# Patient Record
Sex: FEMALE | Race: WHITE | NOT HISPANIC OR LATINO | URBAN - METROPOLITAN AREA
[De-identification: names, ages, dates, MRNs, and addresses within clinical notes are randomized per-mention and may not be internally consistent; named-entity substitution may affect disease eponyms.]

---

## 2018-12-17 ENCOUNTER — INPATIENT (INPATIENT)
Facility: HOSPITAL | Age: 28
LOS: 1 days | Discharge: HOME | End: 2018-12-19
Attending: OBSTETRICS & GYNECOLOGY | Admitting: OBSTETRICS & GYNECOLOGY
Payer: COMMERCIAL

## 2018-12-17 VITALS — HEIGHT: 67 IN | WEIGHT: 169.76 LBS

## 2018-12-17 LAB
ALBUMIN SERPL ELPH-MCNC: 4 G/DL — SIGNIFICANT CHANGE UP (ref 3.3–5)
ALP SERPL-CCNC: 119 U/L — SIGNIFICANT CHANGE UP (ref 40–120)
ALT FLD-CCNC: 25 U/L — SIGNIFICANT CHANGE UP (ref 10–45)
ANION GAP SERPL CALC-SCNC: 15 MMOL/L — SIGNIFICANT CHANGE UP (ref 5–17)
APPEARANCE UR: CLEAR — SIGNIFICANT CHANGE UP
APTT BLD: 29.8 SEC — SIGNIFICANT CHANGE UP (ref 27.5–36.3)
AST SERPL-CCNC: 26 U/L — SIGNIFICANT CHANGE UP (ref 10–40)
BASOPHILS NFR BLD AUTO: 0.1 % — SIGNIFICANT CHANGE UP (ref 0–2)
BILIRUB SERPL-MCNC: 0.2 MG/DL — SIGNIFICANT CHANGE UP (ref 0.2–1.2)
BILIRUB UR-MCNC: NEGATIVE — SIGNIFICANT CHANGE UP
BUN SERPL-MCNC: 7 MG/DL — SIGNIFICANT CHANGE UP (ref 7–23)
CALCIUM SERPL-MCNC: 9 MG/DL — SIGNIFICANT CHANGE UP (ref 8.4–10.5)
CHLORIDE SERPL-SCNC: 102 MMOL/L — SIGNIFICANT CHANGE UP (ref 96–108)
CO2 SERPL-SCNC: 24 MMOL/L — SIGNIFICANT CHANGE UP (ref 22–31)
COLOR SPEC: YELLOW — SIGNIFICANT CHANGE UP
CREAT SERPL-MCNC: 0.47 MG/DL — LOW (ref 0.5–1.3)
DIFF PNL FLD: NEGATIVE — SIGNIFICANT CHANGE UP
EOSINOPHIL NFR BLD AUTO: 0.8 % — SIGNIFICANT CHANGE UP (ref 0–6)
FIBRINOGEN PPP-MCNC: 404 MG/DL — SIGNIFICANT CHANGE UP (ref 258–438)
GLUCOSE SERPL-MCNC: 85 MG/DL — SIGNIFICANT CHANGE UP (ref 70–99)
GLUCOSE UR QL: NEGATIVE — SIGNIFICANT CHANGE UP
HCT VFR BLD CALC: 38.3 % — SIGNIFICANT CHANGE UP (ref 34.5–45)
HGB BLD-MCNC: 12.9 G/DL — SIGNIFICANT CHANGE UP (ref 11.5–15.5)
INR BLD: 0.94 — SIGNIFICANT CHANGE UP (ref 0.88–1.16)
KETONES UR-MCNC: NEGATIVE — SIGNIFICANT CHANGE UP
LDH SERPL L TO P-CCNC: 233 U/L — SIGNIFICANT CHANGE UP (ref 50–242)
LEUKOCYTE ESTERASE UR-ACNC: ABNORMAL
LYMPHOCYTES # BLD AUTO: 9.8 % — LOW (ref 13–44)
MCHC RBC-ENTMCNC: 30.9 PG — SIGNIFICANT CHANGE UP (ref 27–34)
MCHC RBC-ENTMCNC: 33.7 G/DL — SIGNIFICANT CHANGE UP (ref 32–36)
MCV RBC AUTO: 91.6 FL — SIGNIFICANT CHANGE UP (ref 80–100)
MONOCYTES NFR BLD AUTO: 8.9 % — SIGNIFICANT CHANGE UP (ref 2–14)
NEUTROPHILS NFR BLD AUTO: 80.4 % — HIGH (ref 43–77)
NITRITE UR-MCNC: NEGATIVE — SIGNIFICANT CHANGE UP
PH UR: 7 — SIGNIFICANT CHANGE UP (ref 5–8)
PLATELET # BLD AUTO: 134 K/UL — LOW (ref 150–400)
POTASSIUM SERPL-MCNC: 4.3 MMOL/L — SIGNIFICANT CHANGE UP (ref 3.5–5.3)
POTASSIUM SERPL-SCNC: 4.3 MMOL/L — SIGNIFICANT CHANGE UP (ref 3.5–5.3)
PROT SERPL-MCNC: 6.7 G/DL — SIGNIFICANT CHANGE UP (ref 6–8.3)
PROT UR-MCNC: NEGATIVE MG/DL — SIGNIFICANT CHANGE UP
PROTHROM AB SERPL-ACNC: 10.6 SEC — SIGNIFICANT CHANGE UP (ref 10–12.9)
RBC # BLD: 4.18 M/UL — SIGNIFICANT CHANGE UP (ref 3.8–5.2)
RBC # FLD: 14.6 % — SIGNIFICANT CHANGE UP (ref 10.3–16.9)
SODIUM SERPL-SCNC: 141 MMOL/L — SIGNIFICANT CHANGE UP (ref 135–145)
SP GR SPEC: 1.01 — SIGNIFICANT CHANGE UP (ref 1–1.03)
T PALLIDUM AB TITR SER: NEGATIVE — SIGNIFICANT CHANGE UP
URATE SERPL-MCNC: 3 MG/DL — SIGNIFICANT CHANGE UP (ref 2.5–7)
UROBILINOGEN FLD QL: 0.2 E.U./DL — SIGNIFICANT CHANGE UP
WBC # BLD: 10.1 K/UL — SIGNIFICANT CHANGE UP (ref 3.8–10.5)
WBC # FLD AUTO: 10.1 K/UL — SIGNIFICANT CHANGE UP (ref 3.8–10.5)

## 2018-12-17 RX ORDER — ACETAMINOPHEN 500 MG
650 TABLET ORAL EVERY 6 HOURS
Qty: 0 | Refills: 0 | Status: DISCONTINUED | OUTPATIENT
Start: 2018-12-17 | End: 2018-12-19

## 2018-12-17 RX ORDER — OXYCODONE AND ACETAMINOPHEN 5; 325 MG/1; MG/1
1 TABLET ORAL
Qty: 0 | Refills: 0 | Status: DISCONTINUED | OUTPATIENT
Start: 2018-12-17 | End: 2018-12-19

## 2018-12-17 RX ORDER — SODIUM CHLORIDE 9 MG/ML
1000 INJECTION, SOLUTION INTRAVENOUS ONCE
Qty: 0 | Refills: 0 | Status: COMPLETED | OUTPATIENT
Start: 2018-12-17 | End: 2018-12-17

## 2018-12-17 RX ORDER — LANOLIN
1 OINTMENT (GRAM) TOPICAL EVERY 6 HOURS
Qty: 0 | Refills: 0 | Status: DISCONTINUED | OUTPATIENT
Start: 2018-12-17 | End: 2018-12-19

## 2018-12-17 RX ORDER — DIBUCAINE 1 %
1 OINTMENT (GRAM) RECTAL EVERY 4 HOURS
Qty: 0 | Refills: 0 | Status: DISCONTINUED | OUTPATIENT
Start: 2018-12-17 | End: 2018-12-19

## 2018-12-17 RX ORDER — MAGNESIUM HYDROXIDE 400 MG/1
30 TABLET, CHEWABLE ORAL
Qty: 0 | Refills: 0 | Status: DISCONTINUED | OUTPATIENT
Start: 2018-12-17 | End: 2018-12-19

## 2018-12-17 RX ORDER — OXYTOCIN 10 UNIT/ML
1 VIAL (ML) INJECTION
Qty: 30 | Refills: 0 | Status: DISCONTINUED | OUTPATIENT
Start: 2018-12-17 | End: 2018-12-17

## 2018-12-17 RX ORDER — PRAMOXINE HYDROCHLORIDE 150 MG/15G
1 AEROSOL, FOAM RECTAL EVERY 4 HOURS
Qty: 0 | Refills: 0 | Status: DISCONTINUED | OUTPATIENT
Start: 2018-12-17 | End: 2018-12-19

## 2018-12-17 RX ORDER — ENOXAPARIN SODIUM 100 MG/ML
40 INJECTION SUBCUTANEOUS DAILY
Qty: 0 | Refills: 0 | Status: DISCONTINUED | OUTPATIENT
Start: 2018-12-18 | End: 2018-12-19

## 2018-12-17 RX ORDER — AER TRAVELER 0.5 G/1
1 SOLUTION RECTAL; TOPICAL EVERY 4 HOURS
Qty: 0 | Refills: 0 | Status: DISCONTINUED | OUTPATIENT
Start: 2018-12-17 | End: 2018-12-19

## 2018-12-17 RX ORDER — DOCUSATE SODIUM 100 MG
100 CAPSULE ORAL
Qty: 0 | Refills: 0 | Status: DISCONTINUED | OUTPATIENT
Start: 2018-12-17 | End: 2018-12-19

## 2018-12-17 RX ORDER — OXYCODONE AND ACETAMINOPHEN 5; 325 MG/1; MG/1
2 TABLET ORAL EVERY 6 HOURS
Qty: 0 | Refills: 0 | Status: DISCONTINUED | OUTPATIENT
Start: 2018-12-17 | End: 2018-12-17

## 2018-12-17 RX ORDER — FENTANYL/BUPIVACAINE/NS/PF 2MCG/ML-.1
250 PLASTIC BAG, INJECTION (ML) INJECTION
Qty: 0 | Refills: 0 | Status: DISCONTINUED | OUTPATIENT
Start: 2018-12-17 | End: 2018-12-19

## 2018-12-17 RX ORDER — BENZOCAINE 10 %
1 GEL (GRAM) MUCOUS MEMBRANE EVERY 6 HOURS
Qty: 0 | Refills: 0 | Status: DISCONTINUED | OUTPATIENT
Start: 2018-12-17 | End: 2018-12-19

## 2018-12-17 RX ORDER — OXYTOCIN 10 UNIT/ML
333.33 VIAL (ML) INJECTION
Qty: 20 | Refills: 0 | Status: DISCONTINUED | OUTPATIENT
Start: 2018-12-17 | End: 2018-12-18

## 2018-12-17 RX ORDER — DIPHENHYDRAMINE HCL 50 MG
25 CAPSULE ORAL EVERY 6 HOURS
Qty: 0 | Refills: 0 | Status: DISCONTINUED | OUTPATIENT
Start: 2018-12-17 | End: 2018-12-19

## 2018-12-17 RX ORDER — IBUPROFEN 200 MG
600 TABLET ORAL EVERY 6 HOURS
Qty: 0 | Refills: 0 | Status: DISCONTINUED | OUTPATIENT
Start: 2018-12-17 | End: 2018-12-19

## 2018-12-17 RX ORDER — SIMETHICONE 80 MG/1
80 TABLET, CHEWABLE ORAL EVERY 6 HOURS
Qty: 0 | Refills: 0 | Status: DISCONTINUED | OUTPATIENT
Start: 2018-12-17 | End: 2018-12-19

## 2018-12-17 RX ORDER — SODIUM CHLORIDE 9 MG/ML
3 INJECTION INTRAMUSCULAR; INTRAVENOUS; SUBCUTANEOUS EVERY 8 HOURS
Qty: 0 | Refills: 0 | Status: DISCONTINUED | OUTPATIENT
Start: 2018-12-17 | End: 2018-12-19

## 2018-12-17 RX ORDER — TETANUS TOXOID, REDUCED DIPHTHERIA TOXOID AND ACELLULAR PERTUSSIS VACCINE, ADSORBED 5; 2.5; 8; 8; 2.5 [IU]/.5ML; [IU]/.5ML; UG/.5ML; UG/.5ML; UG/.5ML
0.5 SUSPENSION INTRAMUSCULAR ONCE
Qty: 0 | Refills: 0 | Status: DISCONTINUED | OUTPATIENT
Start: 2018-12-17 | End: 2018-12-19

## 2018-12-17 RX ORDER — OXYTOCIN 10 UNIT/ML
25 VIAL (ML) INJECTION
Qty: 20 | Refills: 0 | Status: DISCONTINUED | OUTPATIENT
Start: 2018-12-17 | End: 2018-12-18

## 2018-12-17 RX ORDER — OXYTOCIN 10 UNIT/ML
25 VIAL (ML) INJECTION
Qty: 20 | Refills: 0 | Status: DISCONTINUED | OUTPATIENT
Start: 2018-12-17 | End: 2018-12-19

## 2018-12-17 RX ORDER — SODIUM CHLORIDE 9 MG/ML
1000 INJECTION, SOLUTION INTRAVENOUS
Qty: 0 | Refills: 0 | Status: DISCONTINUED | OUTPATIENT
Start: 2018-12-17 | End: 2018-12-17

## 2018-12-17 RX ORDER — HYDROCORTISONE 1 %
1 OINTMENT (GRAM) TOPICAL EVERY 4 HOURS
Qty: 0 | Refills: 0 | Status: DISCONTINUED | OUTPATIENT
Start: 2018-12-17 | End: 2018-12-19

## 2018-12-17 RX ADMIN — SODIUM CHLORIDE 2000 MILLILITER(S): 9 INJECTION, SOLUTION INTRAVENOUS at 17:15

## 2018-12-17 RX ADMIN — SODIUM CHLORIDE 3 MILLILITER(S): 9 INJECTION INTRAMUSCULAR; INTRAVENOUS; SUBCUTANEOUS at 23:21

## 2018-12-17 RX ADMIN — Medication 1 MILLIUNIT(S)/MIN: at 13:25

## 2018-12-17 RX ADMIN — SODIUM CHLORIDE 125 MILLILITER(S): 9 INJECTION, SOLUTION INTRAVENOUS at 17:46

## 2018-12-17 RX ADMIN — SODIUM CHLORIDE 125 MILLILITER(S): 9 INJECTION, SOLUTION INTRAVENOUS at 11:57

## 2018-12-17 RX ADMIN — Medication 75 MILLIUNIT(S)/MIN: at 21:58

## 2018-12-17 RX ADMIN — SODIUM CHLORIDE 125 MILLILITER(S): 9 INJECTION, SOLUTION INTRAVENOUS at 18:46

## 2018-12-18 RX ADMIN — Medication 600 MILLIGRAM(S): at 00:43

## 2018-12-18 RX ADMIN — Medication 600 MILLIGRAM(S): at 23:34

## 2018-12-18 RX ADMIN — Medication 600 MILLIGRAM(S): at 12:24

## 2018-12-18 RX ADMIN — Medication 100 MILLIGRAM(S): at 06:46

## 2018-12-18 RX ADMIN — Medication 600 MILLIGRAM(S): at 07:29

## 2018-12-18 RX ADMIN — Medication 600 MILLIGRAM(S): at 17:27

## 2018-12-18 RX ADMIN — Medication 1 APPLICATION(S): at 06:45

## 2018-12-18 RX ADMIN — Medication 100 MILLIGRAM(S): at 23:34

## 2018-12-18 RX ADMIN — Medication 600 MILLIGRAM(S): at 18:20

## 2018-12-18 RX ADMIN — Medication 75 MILLIUNIT(S)/MIN: at 01:12

## 2018-12-18 RX ADMIN — ENOXAPARIN SODIUM 40 MILLIGRAM(S): 100 INJECTION SUBCUTANEOUS at 09:27

## 2018-12-18 RX ADMIN — Medication 600 MILLIGRAM(S): at 06:45

## 2018-12-18 RX ADMIN — SODIUM CHLORIDE 3 MILLILITER(S): 9 INJECTION INTRAMUSCULAR; INTRAVENOUS; SUBCUTANEOUS at 06:46

## 2018-12-18 RX ADMIN — Medication 100 MILLIGRAM(S): at 12:25

## 2018-12-18 RX ADMIN — Medication 600 MILLIGRAM(S): at 13:20

## 2018-12-18 NOTE — LACTATION INITIAL EVALUATION - REQUESTED BY
lactation rounds, initial visit. Primaparous mother delivered @ 40 weeks gestation via vaginal delivery. The  was ~ 17 hours old when I consulted @ the bedside. The  has had 2 voids and 4 stools documented since delivery. The mother reported that the  has not latched on and breast fed since birth. The  was placed S2S with the mother in the laid back position. The  made no attempts to latch. The  was sleepy and the  was stimulated to wake and the  cried, was calmed with S2S and fell asleep. Several attempts were made to latch the  in the laid back position and the cross cradle hold unsuccessfully. Hand expression was demonstrated to the mother with return demo. Initially, the colostrum was not easily expressed and after breast massage, the colostrum was able to be expreesed and the  was finger fed the expressed colostrum. Breast feeding guidelines, positioning the  to the breast, latching strategies, early feeding cues, hand expression and 's output requirements reviewed with the mother. Written literature was given to the mother on the above. S2S was encouraged. All questions were answered and the mother verbalized understanding. I encouraged the mother to ask for lactation assistance as needed. lactation rounds, initial visit. Primaparous mother delivered @ 40 weeks gestation via vaginal delivery. The  was ~ 17 hours old when I consulted @ the bedside. The  has had 2 voids and 4 stools documented since delivery. The mother reported that the  has not latched on and breast fed since birth. The  was placed S2S with the mother in the laid back position. The  made no attempts to latch. The  was sleepy and the  was stimulated to wake and the  cried, was calmed with S2S and fell asleep. Several attempts were made to latch the  in the laid back position and the cross cradle hold unsuccessfully. Hand expression was demonstrated to the mother with return demo. Initially, the colostrum was not easily expressed and after breast massage, the colostrum was able to be expreesed and the  was finger fed the expressed colostrum. Breast feeding guidelines, positioning the  to the breast, latching strategies, early feeding cues, cluster feedings, hand expression and 's output requirements reviewed with the mother. Written literature was given to the mother on the above. S2S was encouraged. All questions were answered and the mother verbalized understanding. I encouraged the mother to ask for lactation assistance as needed. Referrals for IBCLC's were given to the mother, if needed. I advised the mother to call her insurance company to see if they will pay for an IBCLC home visit. To F/U with the IBCLC tomorrow.

## 2018-12-18 NOTE — LACTATION INITIAL EVALUATION - INFANT FEEDING PLAN COMMENT, OB PROFILE
To breast feed on demand q 2-3 hours, 8-12 times q 24 hours [ @ least 8 times q 24 hours ]. To hand express / pump q 2-3 hours for 15-20 minutes for stimulation to secure her milk supply for the future and provide the ebm for the , until the  is latching on and breast feeding q 2-3 hours.

## 2018-12-18 NOTE — LACTATION INITIAL EVALUATION - NIPPLE ASSESSMENT (RIGHT)
medium/short, I advised the mother to pump for ~ 2 minutes to pull the nipple out just before trying to latch the  to the breast./flat

## 2018-12-18 NOTE — LACTATION INITIAL EVALUATION - PRO FEM REPRO BREAST PUMP YN
The bedside RN was made aware of the consult and will give the mother the [employer] breast with instructions for use./yes The bedside RN was made aware of the consult and will give the mother the [employer] breast pump with instructions for use./yes

## 2018-12-18 NOTE — PROGRESS NOTE ADULT - SUBJECTIVE AND OBJECTIVE BOX
Patient evaluated at bedside.   She reports pain is well controlled with motrin    She has been ambulating without assistance, voiding spontaneously, and is breastfeeding.    She denies HA, dizziness, chest pain, palpitations, shortness of breathe, n/v, heavy vaginal bleeding or perineal discomfort.    Physical Exam:  Vital Signs Last 24 Hrs  T(C): 37.2 (18 Dec 2018 01:05), Max: 37.2 (17 Dec 2018 23:30)  T(F): 99 (18 Dec 2018 01:05), Max: 99 (17 Dec 2018 23:30)  HR: 84 (18 Dec 2018 01:05) (84 - 127)  BP: 115/68 (18 Dec 2018 01:05) (109/65 - 120/61)  BP(mean): 62 (17 Dec 2018 23:30) (62 - 80)  RR: 18 (18 Dec 2018 01:05) (17 - 18)  SpO2: 96% (18 Dec 2018 01:05) (96% - 100%)    GA: NAD, A+0 x 3  Abd: + BS, soft, nontender, nondistended, no rebound or guarding, uterus firm at midline 3 fb below umbilicus   : lochia WNL  Extremities: no swelling or calf tenderness                          12.9   10.1  )-----------( 134      ( 17 Dec 2018 11:33 )             38.3     12-17    141  |  102  |  7   ----------------------------<  85  4.3   |  24  |  0.47<L>    Ca    9.0      17 Dec 2018 11:33    TPro  6.7  /  Alb  4.0  /  TBili  0.2  /  DBili  x   /  AST  26  /  ALT  25  /  AlkPhos  119  12-17    PT/INR - ( 17 Dec 2018 11:33 )   PT: 10.6 sec;   INR: 0.94          PTT - ( 17 Dec 2018 11:33 )  PTT:29.8 sec    MEDICATIONS  (STANDING):  diphtheria/tetanus/pertussis (acellular) Vaccine (ADAcel) 0.5 milliLiter(s) IntraMuscular once  enoxaparin Injectable 40 milliGRAM(s) SubCutaneous daily  fentaNYL (2 MICROgram(s)/mL) + BUpivacaine 0.1% in 0.9% Sodium Chloride PCEA 250 milliLiter(s) Epidural PCA Continuous  ibuprofen  Tablet. 600 milliGRAM(s) Oral every 6 hours  oxytocin Infusion 25 milliUNIT(s)/Min (75 mL/Hr) IV Continuous <Continuous>  sodium chloride 0.9% lock flush 3 milliLiter(s) IV Push every 8 hours    MEDICATIONS  (PRN):  acetaminophen   Tablet .. 650 milliGRAM(s) Oral every 6 hours PRN Temp greater or equal to 38.5C (101.3F), Mild Pain (1 - 3)  benzocaine 20%/menthol 0.5% Spray 1 Spray(s) Topical every 6 hours PRN Mild Perineal discomfort  dibucaine 1% Ointment 1 Application(s) Topical every 4 hours PRN Perineal Discomfort  diphenhydrAMINE 25 milliGRAM(s) Oral every 6 hours PRN Itching  docusate sodium 100 milliGRAM(s) Oral two times a day PRN Stool Softening  hydrocortisone 1% Cream 1 Application(s) Topical every 4 hours PRN Moderate Perineal Pain  lanolin Ointment 1 Application(s) Topical every 6 hours PRN Sore Nipples  magnesium hydroxide Suspension 30 milliLiter(s) Oral two times a day PRN Constipation  oxyCODONE    5 mG/acetaminophen 325 mG 1 Tablet(s) Oral every 3 hours PRN Moderate Pain (4 - 6)  pramoxine 1%/zinc 5% Cream 1 Application(s) Topical every 4 hours PRN Severe Perineal Pain  simethicone 80 milliGRAM(s) Chew every 6 hours PRN Gas  witch hazel Pads 1 Application(s) Topical every 4 hours PRN Perineal Discomfort Patient evaluated at bedside.   She reports pain is well controlled with motrin    She has been ambulating without assistance, voiding spontaneously, and is breastfeeding.    She denies HA, dizziness, chest pain, palpitations, shortness of breathe, n/v, heavy vaginal bleeding or perineal discomfort. Patient would like to see lactation consultant today     Physical Exam:  Vital Signs Last 24 Hrs  T(C): 37.2 (18 Dec 2018 01:05), Max: 37.2 (17 Dec 2018 23:30)  T(F): 99 (18 Dec 2018 01:05), Max: 99 (17 Dec 2018 23:30)  HR: 84 (18 Dec 2018 01:05) (84 - 127)  BP: 115/68 (18 Dec 2018 01:05) (109/65 - 120/61)  BP(mean): 62 (17 Dec 2018 23:30) (62 - 80)  RR: 18 (18 Dec 2018 01:05) (17 - 18)  SpO2: 96% (18 Dec 2018 01:05) (96% - 100%)    GA: NAD, A+0 x 3  Abd: + BS, soft, nontender, nondistended, no rebound or guarding, uterus firm at midline 3 fb below umbilicus   : lochia WNL  Extremities: no swelling or calf tenderness                          12.9   10.1  )-----------( 134      ( 17 Dec 2018 11:33 )             38.3     12-17    141  |  102  |  7   ----------------------------<  85  4.3   |  24  |  0.47<L>    Ca    9.0      17 Dec 2018 11:33    TPro  6.7  /  Alb  4.0  /  TBili  0.2  /  DBili  x   /  AST  26  /  ALT  25  /  AlkPhos  119  12-17    PT/INR - ( 17 Dec 2018 11:33 )   PT: 10.6 sec;   INR: 0.94          PTT - ( 17 Dec 2018 11:33 )  PTT:29.8 sec    MEDICATIONS  (STANDING):  diphtheria/tetanus/pertussis (acellular) Vaccine (ADAcel) 0.5 milliLiter(s) IntraMuscular once  enoxaparin Injectable 40 milliGRAM(s) SubCutaneous daily  fentaNYL (2 MICROgram(s)/mL) + BUpivacaine 0.1% in 0.9% Sodium Chloride PCEA 250 milliLiter(s) Epidural PCA Continuous  ibuprofen  Tablet. 600 milliGRAM(s) Oral every 6 hours  oxytocin Infusion 25 milliUNIT(s)/Min (75 mL/Hr) IV Continuous <Continuous>  sodium chloride 0.9% lock flush 3 milliLiter(s) IV Push every 8 hours    MEDICATIONS  (PRN):  acetaminophen   Tablet .. 650 milliGRAM(s) Oral every 6 hours PRN Temp greater or equal to 38.5C (101.3F), Mild Pain (1 - 3)  benzocaine 20%/menthol 0.5% Spray 1 Spray(s) Topical every 6 hours PRN Mild Perineal discomfort  dibucaine 1% Ointment 1 Application(s) Topical every 4 hours PRN Perineal Discomfort  diphenhydrAMINE 25 milliGRAM(s) Oral every 6 hours PRN Itching  docusate sodium 100 milliGRAM(s) Oral two times a day PRN Stool Softening  hydrocortisone 1% Cream 1 Application(s) Topical every 4 hours PRN Moderate Perineal Pain  lanolin Ointment 1 Application(s) Topical every 6 hours PRN Sore Nipples  magnesium hydroxide Suspension 30 milliLiter(s) Oral two times a day PRN Constipation  oxyCODONE    5 mG/acetaminophen 325 mG 1 Tablet(s) Oral every 3 hours PRN Moderate Pain (4 - 6)  pramoxine 1%/zinc 5% Cream 1 Application(s) Topical every 4 hours PRN Severe Perineal Pain  simethicone 80 milliGRAM(s) Chew every 6 hours PRN Gas  witch hazel Pads 1 Application(s) Topical every 4 hours PRN Perineal Discomfort

## 2018-12-18 NOTE — LACTATION INITIAL EVALUATION - BREAST ASSESSMENT (LEFT)
soft/The left breast is larger than the right breast and the mother said her left breast has always been larger./medium

## 2018-12-19 VITALS
SYSTOLIC BLOOD PRESSURE: 119 MMHG | DIASTOLIC BLOOD PRESSURE: 64 MMHG | TEMPERATURE: 98 F | HEART RATE: 88 BPM | RESPIRATION RATE: 18 BRPM | OXYGEN SATURATION: 99 %

## 2018-12-19 PROCEDURE — 85025 COMPLETE CBC W/AUTO DIFF WBC: CPT

## 2018-12-19 PROCEDURE — 85384 FIBRINOGEN ACTIVITY: CPT

## 2018-12-19 PROCEDURE — 36415 COLL VENOUS BLD VENIPUNCTURE: CPT

## 2018-12-19 PROCEDURE — 83615 LACTATE (LD) (LDH) ENZYME: CPT

## 2018-12-19 PROCEDURE — 81001 URINALYSIS AUTO W/SCOPE: CPT

## 2018-12-19 PROCEDURE — 88307 TISSUE EXAM BY PATHOLOGIST: CPT

## 2018-12-19 PROCEDURE — 85730 THROMBOPLASTIN TIME PARTIAL: CPT

## 2018-12-19 PROCEDURE — 85610 PROTHROMBIN TIME: CPT

## 2018-12-19 PROCEDURE — 86780 TREPONEMA PALLIDUM: CPT

## 2018-12-19 PROCEDURE — 84550 ASSAY OF BLOOD/URIC ACID: CPT

## 2018-12-19 PROCEDURE — 80053 COMPREHEN METABOLIC PANEL: CPT

## 2018-12-19 RX ORDER — IBUPROFEN 200 MG
1 TABLET ORAL
Qty: 0 | Refills: 0 | DISCHARGE
Start: 2018-12-19

## 2018-12-19 RX ADMIN — Medication 600 MILLIGRAM(S): at 13:20

## 2018-12-19 RX ADMIN — ENOXAPARIN SODIUM 40 MILLIGRAM(S): 100 INJECTION SUBCUTANEOUS at 12:31

## 2018-12-19 RX ADMIN — Medication 600 MILLIGRAM(S): at 00:45

## 2018-12-19 RX ADMIN — Medication 600 MILLIGRAM(S): at 05:36

## 2018-12-19 RX ADMIN — Medication 600 MILLIGRAM(S): at 12:32

## 2018-12-19 RX ADMIN — Medication 1 SPRAY(S): at 12:31

## 2018-12-19 NOTE — DISCHARGE NOTE OB - MEDICATION SUMMARY - MEDICATIONS TO TAKE
I will START or STAY ON the medications listed below when I get home from the hospital:    ibuprofen 600 mg oral tablet  -- 1 tab(s) by mouth every 6 hours  -- Indication: For as needed for pain    multivitamin  -- 1 cap(s) by mouth once a day  -- Indication: For Postpartum state

## 2018-12-19 NOTE — DISCHARGE NOTE OB - PLAN OF CARE
postpartum care 28y female s/p vaginal delivery, postpartum day 2, meeting all postpartum milestones. Pelvic rest until cleared. Follow-up with obstetrician in 6 weeks.

## 2018-12-19 NOTE — DISCHARGE NOTE OB - CARE PLAN
Principal Discharge DX:	Postpartum state  Goal:	postpartum care  Assessment and plan of treatment:	28y female s/p vaginal delivery, postpartum day 2, meeting all postpartum milestones. Pelvic rest until cleared. Follow-up with obstetrician in 6 weeks.

## 2018-12-19 NOTE — PROGRESS NOTE ADULT - SUBJECTIVE AND OBJECTIVE BOX
Pt afebrile abdomen soft fundis firm lochia mild ext nontender no complaints    Receiving lovenox Q d and will receive this for 6 weeks as per her hemotologist   Discharge this am  All instructions given

## 2018-12-19 NOTE — PROGRESS NOTE ADULT - ASSESSMENT
A/P yo 28y  s/p , PP#1 , stable protein S deficiency on lovenox 40 mg daily PP  1. Pain: OPM  2. GI: Reg  3. :  Voiding  4. DVT prophylaxis: SCDs, ambulation  5. Dispo: PP#2
A/P yo 28y  s/p , PP#2 , stable protein S deficiency on lovenox 40 mg daily PP  1. Pain: OPM  2. GI: Reg  3. :  Voiding  4. DVT prophylaxis: SCDs, ambulation  5. Dispo: PP#2

## 2018-12-19 NOTE — DISCHARGE NOTE OB - PATIENT PORTAL LINK FT
You can access the [facility] Portal, offered by [facility], by registering with the following website: [email or web address]

## 2018-12-19 NOTE — PROGRESS NOTE ADULT - SUBJECTIVE AND OBJECTIVE BOX
Patient evaluated at bedside.   She reports pain is well controlled with motrin    She has been ambulating without assistance, voiding spontaneously, and is breastfeeding.    She denies HA, dizziness, chest pain, palpitations, shortness of breathe, n/v, heavy vaginal bleeding or perineal discomfort.    Physical Exam:  Vital Signs Last 24 Hrs  T(C): 36.8 (19 Dec 2018 06:00), Max: 36.8 (19 Dec 2018 06:00)  T(F): 98.2 (19 Dec 2018 06:00), Max: 98.2 (19 Dec 2018 06:00)  HR: 80 (19 Dec 2018 06:00) (80 - 82)  BP: 109/69 (19 Dec 2018 06:00) (107/73 - 109/69)  BP(mean): --  RR: 18 (19 Dec 2018 06:00) (18 - 18)  SpO2: 98% (19 Dec 2018 06:00) (98% - 98%)    GA: NAD, A+0 x 3  Abd: + BS, soft, nontender, nondistended, no rebound or guarding, uterus firm at midline 3 fb below umbilicus   : lochia WNL  Extremities: no swelling or calf tenderness                          12.9   10.1  )-----------( 134      ( 17 Dec 2018 11:33 )             38.3     12-17    141  |  102  |  7   ----------------------------<  85  4.3   |  24  |  0.47<L>    Ca    9.0      17 Dec 2018 11:33    TPro  6.7  /  Alb  4.0  /  TBili  0.2  /  DBili  x   /  AST  26  /  ALT  25  /  AlkPhos  119  12-17    PT/INR - ( 17 Dec 2018 11:33 )   PT: 10.6 sec;   INR: 0.94          PTT - ( 17 Dec 2018 11:33 )  PTT:29.8 sec    MEDICATIONS  (STANDING):  diphtheria/tetanus/pertussis (acellular) Vaccine (ADAcel) 0.5 milliLiter(s) IntraMuscular once  enoxaparin Injectable 40 milliGRAM(s) SubCutaneous daily  fentaNYL (2 MICROgram(s)/mL) + BUpivacaine 0.1% in 0.9% Sodium Chloride PCEA 250 milliLiter(s) Epidural PCA Continuous  ibuprofen  Tablet. 600 milliGRAM(s) Oral every 6 hours  oxytocin Infusion 25 milliUNIT(s)/Min (75 mL/Hr) IV Continuous <Continuous>  sodium chloride 0.9% lock flush 3 milliLiter(s) IV Push every 8 hours    MEDICATIONS  (PRN):  acetaminophen   Tablet .. 650 milliGRAM(s) Oral every 6 hours PRN Temp greater or equal to 38.5C (101.3F), Mild Pain (1 - 3)  benzocaine 20%/menthol 0.5% Spray 1 Spray(s) Topical every 6 hours PRN Mild Perineal discomfort  dibucaine 1% Ointment 1 Application(s) Topical every 4 hours PRN Perineal Discomfort  diphenhydrAMINE 25 milliGRAM(s) Oral every 6 hours PRN Itching  docusate sodium 100 milliGRAM(s) Oral two times a day PRN Stool Softening  hydrocortisone 1% Cream 1 Application(s) Topical every 4 hours PRN Moderate Perineal Pain  lanolin Ointment 1 Application(s) Topical every 6 hours PRN Sore Nipples  magnesium hydroxide Suspension 30 milliLiter(s) Oral two times a day PRN Constipation  oxyCODONE    5 mG/acetaminophen 325 mG 1 Tablet(s) Oral every 3 hours PRN Moderate Pain (4 - 6)  pramoxine 1%/zinc 5% Cream 1 Application(s) Topical every 4 hours PRN Severe Perineal Pain  simethicone 80 milliGRAM(s) Chew every 6 hours PRN Gas  witch hazel Pads 1 Application(s) Topical every 4 hours PRN Perineal Discomfort

## 2018-12-19 NOTE — DISCHARGE NOTE OB - CARE PROVIDER_API CALL
Delon Scanlon), Obstetrics and Gynecology  [address] [place] [address], [place] [address]  Phone: [phone]  Fax: [phone]

## 2018-12-21 LAB — SURGICAL PATHOLOGY STUDY: SIGNIFICANT CHANGE UP

## 2018-12-24 DIAGNOSIS — D68.59 OTHER PRIMARY THROMBOPHILIA: ICD-10-CM

## 2018-12-24 DIAGNOSIS — O41.03X0 OLIGOHYDRAMNIOS, THIRD TRIMESTER, NOT APPLICABLE OR UNSPECIFIED: ICD-10-CM

## 2018-12-24 DIAGNOSIS — Z3A.40 40 WEEKS GESTATION OF PREGNANCY: ICD-10-CM

## 2021-11-30 ENCOUNTER — INPATIENT (INPATIENT)
Facility: HOSPITAL | Age: 31
LOS: 0 days | Discharge: HOME | End: 2021-12-01
Attending: OBSTETRICS & GYNECOLOGY | Admitting: OBSTETRICS & GYNECOLOGY
Payer: COMMERCIAL

## 2021-11-30 VITALS — HEIGHT: 67 IN | WEIGHT: 171.96 LBS

## 2021-11-30 LAB
ALBUMIN SERPL ELPH-MCNC: 4 G/DL — SIGNIFICANT CHANGE UP (ref 3.3–5)
ALP SERPL-CCNC: 84 U/L — SIGNIFICANT CHANGE UP (ref 40–120)
ALT FLD-CCNC: 15 U/L — SIGNIFICANT CHANGE UP (ref 10–45)
ANION GAP SERPL CALC-SCNC: 11 MMOL/L — SIGNIFICANT CHANGE UP (ref 5–17)
ANISOCYTOSIS BLD QL: SLIGHT — SIGNIFICANT CHANGE UP
APPEARANCE UR: CLEAR — SIGNIFICANT CHANGE UP
APTT BLD: 30.6 SEC — SIGNIFICANT CHANGE UP (ref 27.5–35.5)
AST SERPL-CCNC: 16 U/L — SIGNIFICANT CHANGE UP (ref 10–40)
BACTERIA # UR AUTO: ABNORMAL /HPF
BASOPHILS # BLD AUTO: 0 K/UL — SIGNIFICANT CHANGE UP (ref 0–0.2)
BASOPHILS NFR BLD AUTO: 0 % — SIGNIFICANT CHANGE UP (ref 0–2)
BILIRUB SERPL-MCNC: 0.2 MG/DL — SIGNIFICANT CHANGE UP (ref 0.2–1.2)
BILIRUB UR-MCNC: NEGATIVE — SIGNIFICANT CHANGE UP
BLD GP AB SCN SERPL QL: NEGATIVE — SIGNIFICANT CHANGE UP
BUN SERPL-MCNC: 6 MG/DL — LOW (ref 7–23)
CALCIUM SERPL-MCNC: 8.9 MG/DL — SIGNIFICANT CHANGE UP (ref 8.4–10.5)
CHLORIDE SERPL-SCNC: 104 MMOL/L — SIGNIFICANT CHANGE UP (ref 96–108)
CO2 SERPL-SCNC: 22 MMOL/L — SIGNIFICANT CHANGE UP (ref 22–31)
COLOR SPEC: YELLOW — SIGNIFICANT CHANGE UP
COMMENT - URINE: SIGNIFICANT CHANGE UP
COVID-19 SPIKE DOMAIN AB INTERP: POSITIVE
COVID-19 SPIKE DOMAIN ANTIBODY RESULT: >250 U/ML — HIGH
CREAT ?TM UR-MCNC: 99 MG/DL — SIGNIFICANT CHANGE UP
CREAT SERPL-MCNC: 0.45 MG/DL — LOW (ref 0.5–1.3)
DIFF PNL FLD: ABNORMAL
EOSINOPHIL # BLD AUTO: 0.08 K/UL — SIGNIFICANT CHANGE UP (ref 0–0.5)
EOSINOPHIL NFR BLD AUTO: 0.9 % — SIGNIFICANT CHANGE UP (ref 0–6)
EPI CELLS # UR: ABNORMAL /HPF (ref 0–5)
FIBRINOGEN PPP-MCNC: 340 MG/DL — SIGNIFICANT CHANGE UP (ref 258–438)
GIANT PLATELETS BLD QL SMEAR: PRESENT — SIGNIFICANT CHANGE UP
GLUCOSE SERPL-MCNC: 99 MG/DL — SIGNIFICANT CHANGE UP (ref 70–99)
GLUCOSE UR QL: NEGATIVE — SIGNIFICANT CHANGE UP
HCT VFR BLD CALC: 39.4 % — SIGNIFICANT CHANGE UP (ref 34.5–45)
HGB BLD-MCNC: 12.8 G/DL — SIGNIFICANT CHANGE UP (ref 11.5–15.5)
INR BLD: 0.97 — SIGNIFICANT CHANGE UP (ref 0.88–1.16)
KETONES UR-MCNC: NEGATIVE — SIGNIFICANT CHANGE UP
LDH SERPL L TO P-CCNC: 169 U/L — SIGNIFICANT CHANGE UP (ref 50–242)
LEUKOCYTE ESTERASE UR-ACNC: ABNORMAL
LYMPHOCYTES # BLD AUTO: 1.05 K/UL — SIGNIFICANT CHANGE UP (ref 1–3.3)
LYMPHOCYTES # BLD AUTO: 11.6 % — LOW (ref 13–44)
MACROCYTES BLD QL: SLIGHT — SIGNIFICANT CHANGE UP
MANUAL SMEAR VERIFICATION: SIGNIFICANT CHANGE UP
MCHC RBC-ENTMCNC: 30 PG — SIGNIFICANT CHANGE UP (ref 27–34)
MCHC RBC-ENTMCNC: 32.5 GM/DL — SIGNIFICANT CHANGE UP (ref 32–36)
MCV RBC AUTO: 92.3 FL — SIGNIFICANT CHANGE UP (ref 80–100)
MICROCYTES BLD QL: SLIGHT — SIGNIFICANT CHANGE UP
MONOCYTES # BLD AUTO: 0.33 K/UL — SIGNIFICANT CHANGE UP (ref 0–0.9)
MONOCYTES NFR BLD AUTO: 3.6 % — SIGNIFICANT CHANGE UP (ref 2–14)
MYELOCYTES NFR BLD: 0.9 % — HIGH (ref 0–0)
NEUTROPHILS # BLD AUTO: 7.52 K/UL — HIGH (ref 1.8–7.4)
NEUTROPHILS NFR BLD AUTO: 83 % — HIGH (ref 43–77)
NITRITE UR-MCNC: NEGATIVE — SIGNIFICANT CHANGE UP
NRBC # BLD: 1 /100 — HIGH (ref 0–0)
NRBC # BLD: SIGNIFICANT CHANGE UP /100 WBCS (ref 0–0)
PH UR: 7 — SIGNIFICANT CHANGE UP (ref 5–8)
PLAT MORPH BLD: ABNORMAL
PLATELET # BLD AUTO: 148 K/UL — LOW (ref 150–400)
POLYCHROMASIA BLD QL SMEAR: SLIGHT — SIGNIFICANT CHANGE UP
POTASSIUM SERPL-MCNC: 4.1 MMOL/L — SIGNIFICANT CHANGE UP (ref 3.5–5.3)
POTASSIUM SERPL-SCNC: 4.1 MMOL/L — SIGNIFICANT CHANGE UP (ref 3.5–5.3)
PROT ?TM UR-MCNC: 21 MG/DL — HIGH (ref 0–12)
PROT ?TM UR-MCNC: 21 MG/DL — HIGH (ref 0–12)
PROT SERPL-MCNC: 6.6 G/DL — SIGNIFICANT CHANGE UP (ref 6–8.3)
PROT UR-MCNC: NEGATIVE MG/DL — SIGNIFICANT CHANGE UP
PROT/CREAT UR-RTO: 0.2 RATIO — SIGNIFICANT CHANGE UP (ref 0–0.2)
PROTHROM AB SERPL-ACNC: 11.7 SEC — SIGNIFICANT CHANGE UP (ref 10.6–13.6)
RBC # BLD: 4.27 M/UL — SIGNIFICANT CHANGE UP (ref 3.8–5.2)
RBC # FLD: 14.2 % — SIGNIFICANT CHANGE UP (ref 10.3–14.5)
RBC BLD AUTO: ABNORMAL
RBC CASTS # UR COMP ASSIST: < 5 /HPF — SIGNIFICANT CHANGE UP
RH IG SCN BLD-IMP: POSITIVE — SIGNIFICANT CHANGE UP
RH IG SCN BLD-IMP: POSITIVE — SIGNIFICANT CHANGE UP
SARS-COV-2 IGG+IGM SERPL QL IA: >250 U/ML — HIGH
SARS-COV-2 IGG+IGM SERPL QL IA: POSITIVE
SODIUM SERPL-SCNC: 137 MMOL/L — SIGNIFICANT CHANGE UP (ref 135–145)
SP GR SPEC: 1.02 — SIGNIFICANT CHANGE UP (ref 1–1.03)
SPHEROCYTES BLD QL SMEAR: SLIGHT — SIGNIFICANT CHANGE UP
T PALLIDUM AB TITR SER: NEGATIVE — SIGNIFICANT CHANGE UP
URATE SERPL-MCNC: 2.9 MG/DL — SIGNIFICANT CHANGE UP (ref 2.5–7)
UROBILINOGEN FLD QL: 0.2 E.U./DL — SIGNIFICANT CHANGE UP
WBC # BLD: 9.06 K/UL — SIGNIFICANT CHANGE UP (ref 3.8–10.5)
WBC # FLD AUTO: 9.06 K/UL — SIGNIFICANT CHANGE UP (ref 3.8–10.5)
WBC UR QL: ABNORMAL /HPF

## 2021-11-30 PROCEDURE — 88307 TISSUE EXAM BY PATHOLOGIST: CPT | Mod: 26

## 2021-11-30 RX ORDER — ACETAMINOPHEN 500 MG
975 TABLET ORAL
Refills: 0 | Status: DISCONTINUED | OUTPATIENT
Start: 2021-11-30 | End: 2021-12-01

## 2021-11-30 RX ORDER — SODIUM CHLORIDE 9 MG/ML
1000 INJECTION, SOLUTION INTRAVENOUS
Refills: 0 | Status: DISCONTINUED | OUTPATIENT
Start: 2021-11-30 | End: 2021-11-30

## 2021-11-30 RX ORDER — OXYTOCIN 10 UNIT/ML
333.33 VIAL (ML) INJECTION
Qty: 20 | Refills: 0 | Status: DISCONTINUED | OUTPATIENT
Start: 2021-11-30 | End: 2021-11-30

## 2021-11-30 RX ORDER — BENZOCAINE 10 %
1 GEL (GRAM) MUCOUS MEMBRANE EVERY 6 HOURS
Refills: 0 | Status: DISCONTINUED | OUTPATIENT
Start: 2021-11-30 | End: 2021-12-01

## 2021-11-30 RX ORDER — ENOXAPARIN SODIUM 100 MG/ML
40 INJECTION SUBCUTANEOUS EVERY 24 HOURS
Refills: 0 | Status: DISCONTINUED | OUTPATIENT
Start: 2021-11-30 | End: 2021-11-30

## 2021-11-30 RX ORDER — MAGNESIUM HYDROXIDE 400 MG/1
30 TABLET, CHEWABLE ORAL
Refills: 0 | Status: DISCONTINUED | OUTPATIENT
Start: 2021-11-30 | End: 2021-12-01

## 2021-11-30 RX ORDER — LANOLIN
1 OINTMENT (GRAM) TOPICAL EVERY 6 HOURS
Refills: 0 | Status: DISCONTINUED | OUTPATIENT
Start: 2021-11-30 | End: 2021-12-01

## 2021-11-30 RX ORDER — OXYCODONE HYDROCHLORIDE 5 MG/1
5 TABLET ORAL ONCE
Refills: 0 | Status: DISCONTINUED | OUTPATIENT
Start: 2021-11-30 | End: 2021-12-01

## 2021-11-30 RX ORDER — OXYCODONE HYDROCHLORIDE 5 MG/1
5 TABLET ORAL
Refills: 0 | Status: DISCONTINUED | OUTPATIENT
Start: 2021-11-30 | End: 2021-12-01

## 2021-11-30 RX ORDER — IBUPROFEN 200 MG
600 TABLET ORAL EVERY 6 HOURS
Refills: 0 | Status: DISCONTINUED | OUTPATIENT
Start: 2021-11-30 | End: 2021-12-01

## 2021-11-30 RX ORDER — FENTANYL/BUPIVACAINE/NS/PF 2MCG/ML-.1
250 PLASTIC BAG, INJECTION (ML) INJECTION
Refills: 0 | Status: DISCONTINUED | OUTPATIENT
Start: 2021-11-30 | End: 2021-11-30

## 2021-11-30 RX ORDER — IBUPROFEN 200 MG
600 TABLET ORAL EVERY 6 HOURS
Refills: 0 | Status: COMPLETED | OUTPATIENT
Start: 2021-11-30 | End: 2022-10-29

## 2021-11-30 RX ORDER — SODIUM CHLORIDE 9 MG/ML
3 INJECTION INTRAMUSCULAR; INTRAVENOUS; SUBCUTANEOUS EVERY 8 HOURS
Refills: 0 | Status: DISCONTINUED | OUTPATIENT
Start: 2021-11-30 | End: 2021-12-01

## 2021-11-30 RX ORDER — KETOROLAC TROMETHAMINE 30 MG/ML
30 SYRINGE (ML) INJECTION ONCE
Refills: 0 | Status: DISCONTINUED | OUTPATIENT
Start: 2021-11-30 | End: 2021-11-30

## 2021-11-30 RX ORDER — PRAMOXINE HYDROCHLORIDE 150 MG/15G
1 AEROSOL, FOAM RECTAL EVERY 4 HOURS
Refills: 0 | Status: DISCONTINUED | OUTPATIENT
Start: 2021-11-30 | End: 2021-12-01

## 2021-11-30 RX ORDER — TETANUS TOXOID, REDUCED DIPHTHERIA TOXOID AND ACELLULAR PERTUSSIS VACCINE, ADSORBED 5; 2.5; 8; 8; 2.5 [IU]/.5ML; [IU]/.5ML; UG/.5ML; UG/.5ML; UG/.5ML
0.5 SUSPENSION INTRAMUSCULAR ONCE
Refills: 0 | Status: COMPLETED | OUTPATIENT
Start: 2021-11-30

## 2021-11-30 RX ORDER — HYDROCORTISONE 1 %
1 OINTMENT (GRAM) TOPICAL EVERY 6 HOURS
Refills: 0 | Status: DISCONTINUED | OUTPATIENT
Start: 2021-11-30 | End: 2021-12-01

## 2021-11-30 RX ORDER — DIPHENHYDRAMINE HCL 50 MG
25 CAPSULE ORAL EVERY 6 HOURS
Refills: 0 | Status: DISCONTINUED | OUTPATIENT
Start: 2021-11-30 | End: 2021-12-01

## 2021-11-30 RX ORDER — OXYTOCIN 10 UNIT/ML
1 VIAL (ML) INJECTION
Qty: 30 | Refills: 0 | Status: DISCONTINUED | OUTPATIENT
Start: 2021-11-30 | End: 2021-11-30

## 2021-11-30 RX ORDER — DIBUCAINE 1 %
1 OINTMENT (GRAM) RECTAL EVERY 6 HOURS
Refills: 0 | Status: DISCONTINUED | OUTPATIENT
Start: 2021-11-30 | End: 2021-12-01

## 2021-11-30 RX ORDER — SIMETHICONE 80 MG/1
80 TABLET, CHEWABLE ORAL EVERY 4 HOURS
Refills: 0 | Status: DISCONTINUED | OUTPATIENT
Start: 2021-11-30 | End: 2021-12-01

## 2021-11-30 RX ORDER — AER TRAVELER 0.5 G/1
1 SOLUTION RECTAL; TOPICAL EVERY 4 HOURS
Refills: 0 | Status: DISCONTINUED | OUTPATIENT
Start: 2021-11-30 | End: 2021-12-01

## 2021-11-30 RX ORDER — OXYTOCIN 10 UNIT/ML
25 VIAL (ML) INJECTION
Qty: 20 | Refills: 0 | Status: DISCONTINUED | OUTPATIENT
Start: 2021-11-30 | End: 2021-12-01

## 2021-11-30 RX ORDER — ENOXAPARIN SODIUM 100 MG/ML
40 INJECTION SUBCUTANEOUS EVERY 24 HOURS
Refills: 0 | Status: DISCONTINUED | OUTPATIENT
Start: 2021-12-01 | End: 2021-12-01

## 2021-11-30 RX ORDER — CITRIC ACID/SODIUM CITRATE 300-500 MG
15 SOLUTION, ORAL ORAL EVERY 6 HOURS
Refills: 0 | Status: DISCONTINUED | OUTPATIENT
Start: 2021-11-30 | End: 2021-11-30

## 2021-11-30 RX ADMIN — Medication 30 MILLIGRAM(S): at 15:51

## 2021-11-30 RX ADMIN — Medication 600 MILLIGRAM(S): at 22:20

## 2021-11-30 RX ADMIN — Medication 250 MILLILITER(S): at 13:44

## 2021-11-30 RX ADMIN — Medication 1 MILLIUNIT(S)/MIN: at 12:00

## 2021-11-30 RX ADMIN — Medication 975 MILLIGRAM(S): at 18:33

## 2021-11-30 RX ADMIN — SODIUM CHLORIDE 125 MILLILITER(S): 9 INJECTION, SOLUTION INTRAVENOUS at 11:55

## 2021-11-30 RX ADMIN — Medication 600 MILLIGRAM(S): at 21:28

## 2021-11-30 RX ADMIN — Medication 30 MILLIGRAM(S): at 15:35

## 2021-11-30 RX ADMIN — Medication 1000 MILLIUNIT(S)/MIN: at 15:11

## 2021-11-30 RX ADMIN — SODIUM CHLORIDE 125 MILLILITER(S): 9 INJECTION, SOLUTION INTRAVENOUS at 07:28

## 2021-11-30 RX ADMIN — SODIUM CHLORIDE 3 MILLILITER(S): 9 INJECTION INTRAMUSCULAR; INTRAVENOUS; SUBCUTANEOUS at 22:46

## 2021-11-30 RX ADMIN — Medication 975 MILLIGRAM(S): at 18:56

## 2021-11-30 RX ADMIN — Medication 75 MILLIUNIT(S)/MIN: at 20:20

## 2021-11-30 NOTE — LACTATION INITIAL EVALUATION - NS LACT CON REASON FOR REQ
reviewed bfing basics, positioning techniques, feeding/satiety cues, signs of good latch, and encouraged s2s contact. taught hand expression with return demo. advised responsive feeding 8-12x/day. Primary RN/ LC to perform latch check when applicable./multiparous mom

## 2021-11-30 NOTE — PATIENT PROFILE OB - FALL HARM RISK - UNIVERSAL INTERVENTIONS
Bed in lowest position, wheels locked, appropriate side rails in place/Call bell, personal items and telephone in reach/Instruct patient to call for assistance before getting out of bed or chair/Non-slip footwear when patient is out of bed/[place] to call system/Physically safe environment - no spills, clutter or unnecessary equipment/Purposeful Proactive Rounding/Room/bathroom lighting operational, light cord in reach

## 2021-11-30 NOTE — LACTATION INITIAL EVALUATION - LACTATION INTERVENTIONS
initiate/review safe skin-to-skin/initiate/review hand expression/initiate/review techniques for position and latch/review techniques to increase milk supply/review techniques to manage sore nipples/engorgement/initiate/review breast massage/compression/reviewed components of an effective feeding and at least 8 effective feedings per day required/reviewed importance of monitoring infant diapers, the breastfeeding log, and minimum output each day/reviewed benefits and recommendations for rooming in/reviewed feeding on demand/by cue at least 8 times a day/reviewed indications of inadequate milk transfer that would require supplementation

## 2021-12-01 VITALS
RESPIRATION RATE: 18 BRPM | TEMPERATURE: 97 F | SYSTOLIC BLOOD PRESSURE: 119 MMHG | HEART RATE: 72 BPM | OXYGEN SATURATION: 98 % | DIASTOLIC BLOOD PRESSURE: 75 MMHG

## 2021-12-01 LAB
HCT VFR BLD CALC: 36.3 % — SIGNIFICANT CHANGE UP (ref 34.5–45)
HGB BLD-MCNC: 12 G/DL — SIGNIFICANT CHANGE UP (ref 11.5–15.5)

## 2021-12-01 PROCEDURE — 85384 FIBRINOGEN ACTIVITY: CPT

## 2021-12-01 PROCEDURE — 85025 COMPLETE CBC W/AUTO DIFF WBC: CPT

## 2021-12-01 PROCEDURE — 86850 RBC ANTIBODY SCREEN: CPT

## 2021-12-01 PROCEDURE — 36415 COLL VENOUS BLD VENIPUNCTURE: CPT

## 2021-12-01 PROCEDURE — 82570 ASSAY OF URINE CREATININE: CPT

## 2021-12-01 PROCEDURE — 85730 THROMBOPLASTIN TIME PARTIAL: CPT

## 2021-12-01 PROCEDURE — 81001 URINALYSIS AUTO W/SCOPE: CPT

## 2021-12-01 PROCEDURE — 85018 HEMOGLOBIN: CPT

## 2021-12-01 PROCEDURE — 86769 SARS-COV-2 COVID-19 ANTIBODY: CPT

## 2021-12-01 PROCEDURE — 85610 PROTHROMBIN TIME: CPT

## 2021-12-01 PROCEDURE — 85014 HEMATOCRIT: CPT

## 2021-12-01 PROCEDURE — 86780 TREPONEMA PALLIDUM: CPT

## 2021-12-01 PROCEDURE — 88307 TISSUE EXAM BY PATHOLOGIST: CPT

## 2021-12-01 PROCEDURE — 83615 LACTATE (LD) (LDH) ENZYME: CPT

## 2021-12-01 PROCEDURE — 90715 TDAP VACCINE 7 YRS/> IM: CPT

## 2021-12-01 PROCEDURE — 59050 FETAL MONITOR W/REPORT: CPT

## 2021-12-01 PROCEDURE — 86900 BLOOD TYPING SEROLOGIC ABO: CPT

## 2021-12-01 PROCEDURE — 59025 FETAL NON-STRESS TEST: CPT

## 2021-12-01 PROCEDURE — 84550 ASSAY OF BLOOD/URIC ACID: CPT

## 2021-12-01 PROCEDURE — 86901 BLOOD TYPING SEROLOGIC RH(D): CPT

## 2021-12-01 PROCEDURE — 84156 ASSAY OF PROTEIN URINE: CPT

## 2021-12-01 PROCEDURE — 80053 COMPREHEN METABOLIC PANEL: CPT

## 2021-12-01 RX ORDER — TETANUS TOXOID, REDUCED DIPHTHERIA TOXOID AND ACELLULAR PERTUSSIS VACCINE, ADSORBED 5; 2.5; 8; 8; 2.5 [IU]/.5ML; [IU]/.5ML; UG/.5ML; UG/.5ML; UG/.5ML
0.5 SUSPENSION INTRAMUSCULAR ONCE
Refills: 0 | Status: COMPLETED | OUTPATIENT
Start: 2021-12-01 | End: 2021-12-01

## 2021-12-01 RX ORDER — ENOXAPARIN SODIUM 100 MG/ML
40 INJECTION SUBCUTANEOUS
Qty: 42 | Refills: 0
Start: 2021-12-01 | End: 2022-01-11

## 2021-12-01 RX ORDER — BENZOCAINE 10 %
1 GEL (GRAM) MUCOUS MEMBRANE
Qty: 0 | Refills: 0 | DISCHARGE
Start: 2021-12-01

## 2021-12-01 RX ORDER — ENOXAPARIN SODIUM 100 MG/ML
40 INJECTION SUBCUTANEOUS
Qty: 0 | Refills: 0 | DISCHARGE
Start: 2021-12-01

## 2021-12-01 RX ORDER — ACETAMINOPHEN 500 MG
3 TABLET ORAL
Qty: 0 | Refills: 0 | DISCHARGE
Start: 2021-12-01

## 2021-12-01 RX ORDER — IBUPROFEN 200 MG
1 TABLET ORAL
Qty: 0 | Refills: 0 | DISCHARGE
Start: 2021-12-01

## 2021-12-01 RX ORDER — ENOXAPARIN SODIUM 100 MG/ML
40 INJECTION SUBCUTANEOUS
Qty: 1 | Refills: 0
Start: 2021-12-01 | End: 2022-01-11

## 2021-12-01 RX ADMIN — ENOXAPARIN SODIUM 40 MILLIGRAM(S): 100 INJECTION SUBCUTANEOUS at 09:43

## 2021-12-01 RX ADMIN — Medication 975 MILLIGRAM(S): at 13:30

## 2021-12-01 RX ADMIN — Medication 600 MILLIGRAM(S): at 09:43

## 2021-12-01 RX ADMIN — TETANUS TOXOID, REDUCED DIPHTHERIA TOXOID AND ACELLULAR PERTUSSIS VACCINE, ADSORBED 0.5 MILLILITER(S): 5; 2.5; 8; 8; 2.5 SUSPENSION INTRAMUSCULAR at 16:07

## 2021-12-01 RX ADMIN — Medication 1 SPRAY(S): at 16:06

## 2021-12-01 RX ADMIN — Medication 975 MILLIGRAM(S): at 02:12

## 2021-12-01 RX ADMIN — Medication 600 MILLIGRAM(S): at 17:25

## 2021-12-01 RX ADMIN — Medication 600 MILLIGRAM(S): at 10:15

## 2021-12-01 RX ADMIN — Medication 975 MILLIGRAM(S): at 12:46

## 2021-12-01 RX ADMIN — Medication 600 MILLIGRAM(S): at 16:06

## 2021-12-01 RX ADMIN — Medication 975 MILLIGRAM(S): at 06:59

## 2021-12-01 RX ADMIN — SODIUM CHLORIDE 3 MILLILITER(S): 9 INJECTION INTRAMUSCULAR; INTRAVENOUS; SUBCUTANEOUS at 06:49

## 2021-12-01 RX ADMIN — Medication 600 MILLIGRAM(S): at 04:30

## 2021-12-01 RX ADMIN — Medication 975 MILLIGRAM(S): at 07:52

## 2021-12-01 RX ADMIN — Medication 1 SPRAY(S): at 00:10

## 2021-12-01 RX ADMIN — Medication 1 APPLICATION(S): at 00:10

## 2021-12-01 RX ADMIN — Medication 1 APPLICATION(S): at 16:06

## 2021-12-01 RX ADMIN — Medication 600 MILLIGRAM(S): at 03:39

## 2021-12-01 RX ADMIN — Medication 975 MILLIGRAM(S): at 01:20

## 2021-12-01 RX ADMIN — SIMETHICONE 80 MILLIGRAM(S): 80 TABLET, CHEWABLE ORAL at 01:22

## 2021-12-01 NOTE — PROGRESS NOTE ADULT - SUBJECTIVE AND OBJECTIVE BOX
Pt afebrile abdomen soft fundis firm lochia mild ext non tender voiding well no issue   Post partum instructions given   D/C this afternoon

## 2021-12-01 NOTE — DISCHARGE NOTE OB - CARE PROVIDER_API CALL
Delon Scanlon)  Obstetrics and Gynecology  [address] [place] [address], [address] [place] [address]  Phone: [phone]  Fax: [phone]  Follow Up Time: 1 month

## 2021-12-01 NOTE — PROGRESS NOTE ADULT - ASSESSMENT
A/P 31y s/p , PPD #1  , stable, meeting postpartum milestones   - Pain: well controlled on oral pain meds  - GI: Tolerating regular diet  - : urinating without difficulty/pain  -DVT prophylaxis: ambulating frequently, lovenox for 6 weeks pp  -Dispo: PPD 2, unless otherwise specified   Attending Attestation (For Attendings USE Only)...

## 2021-12-01 NOTE — DISCHARGE NOTE OB - PATIENT PORTAL LINK FT
You can access the FollowMyHealth Patient Portal offered by [facility] by registering at the following website: [email or web address]. By joining [employer]’s FollowMyHealth portal, you will also be able to view your health information using other applications (apps) compatible with our system.

## 2021-12-01 NOTE — DISCHARGE NOTE OB - NS MD DC FALL RISK RISK
For information on Fall & Injury Prevention, visit: [email or web address] OR  [email or web address] OR  https://www.cdc.gov/steadi/patient.html

## 2021-12-01 NOTE — DISCHARGE NOTE OB - MEDICATION SUMMARY - MEDICATIONS TO TAKE
Social work present upon pt arrival, family dispute per TPD. Pt may benefit from [facility] referral once medically stable. No immediate social work intervention needed. Social work remains available should any need arise.      [place]  12/25/20 8778 I will START or STAY ON the medications listed below when I get home from the hospital:    acetaminophen 325 mg oral tablet  -- 3 tab(s) by mouth   -- Indication: For Pain    ibuprofen 600 mg oral tablet  -- 1 tab(s) by mouth every 6 hours  -- Indication: For Pain    enoxaparin  -- 40 milligram(s) subcutaneous once a day  -- Indication: For Postpartum    benzocaine 20% topical spray  -- 1 spray(s) on skin every 6 hours, As needed, for Perineal discomfort  -- Indication: For Perineal pain

## 2021-12-01 NOTE — PROGRESS NOTE ADULT - SUBJECTIVE AND OBJECTIVE BOX
Patient evaluated at bedside this morning, resting comfortable in bed, no acute events overnight.  She reports pain is well controlled with tylenol and motrin  She denies headache, dizziness, chest pain, palpitations, shortness of breath, nausea, vomiting, heavy vaginal bleeding or perineal discomfort. Reports decrease in amount of vaginal bleeding and denies clots.  She has been ambulating without assistance, voiding spontaneously, and is breastfeeding.   Tolerating food well, without nausea/vomit.  Passing flatus.     Physical Exam:  T(C): 36.6 (12-01-21 @ 06:00), Max: 36.9 (11-30-21 @ 22:00)  HR: 81 (12-01-21 @ 06:00) (67 - 81)  BP: 97/60 (12-01-21 @ 06:00) (97/60 - 116/66)  RR: 17 (12-01-21 @ 06:00) (17 - 18)  SpO2: 98% (12-01-21 @ 06:00) (96% - 98%)    GA: NAD, A&O x 3  Pulm: normal respiratory rate  Abd: + BS, soft, nontender, nondistended, no rebound or guarding, uterus firm at midline and 2  fb below umbilicus  Extremities: no swelling or calf tenderness                          12.8   9.06  )-----------( 148      ( 30 Nov 2021 07:00 )             39.4     11-30    137  |  104  |  6<L>  ----------------------------<  99  4.1   |  22  |  0.45<L>    Ca    8.9      30 Nov 2021 07:00    TPro  6.6  /  Alb  4.0  /  TBili  0.2  /  DBili  x   /  AST  16  /  ALT  15  /  AlkPhos  84  11-30    acetaminophen     Tablet .. 975 milliGRAM(s) Oral <User Schedule>  benzocaine 20%/menthol 0.5% Spray 1 Spray(s) Topical every 6 hours PRN  dibucaine 1% Ointment 1 Application(s) Topical every 6 hours PRN  diphenhydrAMINE 25 milliGRAM(s) Oral every 6 hours PRN  diphtheria/tetanus/pertussis (acellular) Vaccine (ADAcel) 0.5 milliLiter(s) IntraMuscular once  enoxaparin Injectable 40 milliGRAM(s) SubCutaneous every 24 hours  hydrocortisone 1% Cream 1 Application(s) Topical every 6 hours PRN  ibuprofen  Tablet. 600 milliGRAM(s) Oral every 6 hours  lanolin Ointment 1 Application(s) Topical every 6 hours PRN  magnesium hydroxide Suspension 30 milliLiter(s) Oral two times a day PRN  oxyCODONE    IR 5 milliGRAM(s) Oral every 3 hours PRN  oxyCODONE    IR 5 milliGRAM(s) Oral once PRN  oxytocin Infusion 25 milliUNIT(s)/Min IV Continuous <Continuous>  pramoxine 1%/zinc 5% Cream 1 Application(s) Topical every 4 hours PRN  simethicone 80 milliGRAM(s) Chew every 4 hours PRN  sodium chloride 0.9% lock flush 3 milliLiter(s) IV Push every 8 hours  witch hazel Pads 1 Application(s) Topical every 4 hours PRN

## 2021-12-06 DIAGNOSIS — Z3A.39 39 WEEKS GESTATION OF PREGNANCY: ICD-10-CM

## 2021-12-08 LAB — SURGICAL PATHOLOGY STUDY: SIGNIFICANT CHANGE UP

## 2024-12-16 NOTE — PATIENT PROFILE OB - NS PRO TDAP RECEIVED Y/N
[facility]    Medicine Progress Note - Hospitalist Service, GOLD TEAM 8    Date of Admission:  11/21/2024    Assessment & Plan   Asya Coffman is a 79 year old female admitted on 11/21/2024. 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 Initially admitted to hospital medicine service with hypoxic respiratory failure suspected due to HFpEF requiring diuresis. Developed acute hypoxia on 12/8 secondary to suspected aspiration pneumonitis for which she was intubated (12/8-12/10). Now s/p extubation and transferring back to hospital medicine service.    Changes today:  - Speech and Nutrition continue to follow   - NPO on TF  - still with significant secretions that cause tachypnea and respiratory distress until patient is deep suctioned  - ENT evaluated today for dysphagia but unable to complete exam, see note. Will reattempt tomorrow  - GI also evaluated today for consideration of possible esophageal web, which they do not believe was ever present. Signed off      Oral secretions, increased  Moderate-severe pharyngeal dysphagia  Esophageal dysmotility  Laryngeal squamous cell carcinoma s/p radiation (4/2024-7/2024)  Has had increased dysphagia in the setting of laryngeal squamous cell carcinoma s/p radiation (4/2024-7/2024) as well as some concern for esophageal web on swallow study (10/8/2024) pending GI input. Some concern for respiratory decompensation due to recurrent aspiration events.   - Speech following  - NPO - okay for ice chips for comfort with 1:1 supervision  - Will need VFSS -- SLP will notify when they feel she is ready for this  - consulting ENT for bedside laryngoscopy to see if  h/o papillomas and radiation related to the dysphagia  - Tomorrow will consider GI consult for management of esophageal web    Acute hypoxic respiratory failure - improving  Aspiration pneumonitis  MSSA PNA s/p abx (12/3-12/8)  Obstructive lung disease (COPD vs asthma)  Pulmonary hypertension   Presented on 11/21 with acute hypoxic respiratory failure; initially suspected to be secondary to HFpEF exacerbation; unresolved with diuresis. Underlying obstructive lung disease but no evidence of exacerbation here. Was treated for MSSA pneumonia. Overnight 12/8, developed sudden worsening of oxygenation and increased secretions in the setting of dysphagia/recent laryngeal radiation and was intubated for airway protection. Suspected aspiration pneumonitis as etiology. CT Chest (12/8) with mediastinal/hilar lymphadenopathy and resolving diffuse ground glass opacities most consistent with resolving pneumonia; no signs of progressive or secondary infectious process. Was briefly on zosyn but this was discontinued as she was treated for full course based on initial pneumonia. She has been extubated and now on 2L. Ongoing upper airway adventitious sounds.   - Pulse oximetry, supplemental O2  - Budesonide neb 0.5 mg BID, ipratropium-levalbuterol neb QID  - Aspiration precautions; speech following   - Sputum culture (12/9) grew candida but leukocytosis improved and back on minimal low flow oxygen so will not treat for now  - Flutter valve, incentive spirometer  - Low threshold to resume empiric abx for pnuemonia coverage      HFpEF (LVEF 55-60% 11/22/2024)   Pulmonary hypertension (44 mmHg per echo 11/22/2024)  Possible small PFO  Echo during current admission (11/22/2024) notable for increased thickness of LV and elevated BNP (peak 9200 > 6200) concerning for heart failure exacerbation s/p diuresis and pulmonary hypertension with estimated pulmonary artery pressure of 45 mmHg. Echo with bubble study (11/25/2024) concerning for  possible small PFO. With intubation on 12/8, developed hypotension without evidence of shock. Etiology of hypotension may be cardiogenic (e.g. exacerbation of pulmonary hypertension by positive pressure ventilation) vs medication associated (propofol, precedex); do not currently suspect distributive or obstructive hypotension. Weaned off pressors 12/10.   - Preload dependent in the setting of pulmonary hypertension, gentle diuresis as needed (will hold today)   - 2L FR    Chronic afib  [id number] 3 (age++, HF+).   - PTA apixaban 5mg BID  - PTA diltiazem 240 ER BID - HOLD (cannot give ER enterally)               Up-titrate to short-acting Diltiazem 120mg q6H as tolerated by blood pressure  - PTA propanolol 10mg BID      At risk for refeeding syndrome  Starvation ketosis  Hypokalemia  Severe malnutrition in context of chronic illness/disease  Per daughter, baseline weight around 120-130 lbs. Admitted 102 lbs. Patient states she is eating less and has less appetite in general as well as having difficulty swallowing. While NPO during intubation, developed starvation ketosis. Given high risk for aspiration with PO intake, placed NGT and started tube feeds. Will need close monitoring for refeeding syndrome.   - NPO   - RD following for TF management   - Monitor for refeeding syndrome   - Lyte replacement protocol   - IV thiamine replacement      Oliguric SAMIR  Cr 0.9 on admission. Baseline appears 0.9-1.0. Developed SAMIR initially in setting of diuresis and had been improving. Subsequently increased following transfer to ICU with consideration for prerenal in setting of NPO status. Also with new diarrhea. Cr stable.   - Strict I/Os  - Trend BMP   - FWF via NGT    Diarrhea  Frequent loose stools. Unclear timing of onset so not sure if related to abx or TF initiation. No abdominal pain.  - Monitor   - Hold on infectious testing for now     Hypothyroidism  Hx of thyroidectomy 2/2 cancer   - Continue PTA levothyroxine 88 mcg  daily      Anemia of chronic illness  Baseline Hgb 11-12. Currently near baseline. Can consider anemia of chronic illness.   - Monitor CBC     Generalized deconditioning  Recurrent falls  - PT/OT consults     CREST Syndrome  Characterized by Raynaud's, Calcinosis, GERD and some telangectasia's. Last saw [facility]ology 2017. No history of ILD.   - Continue protonix 40 mg daily     Anxiety  Depression  - Continue PTA Sertraline 150 mg daily           Diet: Fluid restriction 2000 ML FLUID  NPO for Medical/Clinical Reasons Except for: No Exceptions  Adult Formula Drip Feeding: Continuous [employer] Peptide 1.5; Nasogastric tube; Goal Rate: 45; mL/hr; 12/13: please transition to new TF formula immediately when arrives to pt room and ok to start at new goal rate; Do not advance tube feeding rat...    DVT Prophylaxis: DOAC  Miranda Catheter: Not present  Lines: None     Cardiac Monitoring: None  Code Status: Full Code      Clinically Significant Risk Factors               # Hypoalbuminemia: Lowest albumin = 3 g/dL at 12/10/2024  3:11 AM, will monitor as appropriate                 # Severe Malnutrition: based on nutrition assessment    # Financial/Environmental Concerns: none         Social Drivers of Health    Housing Stability: High Risk (11/23/2024)    Housing Stability     Do you have housing? : No     Are you worried about losing your housing?: No          Disposition Plan     Medically Ready for Discharge: Anticipated in 5+ Days             Jesu Lunsford MD  Hospitalist Service, [address]  Securely message with [employer] (more info)  Text page via [facility] Paging/Directory   See signed in provider for up to date coverage information  ______________________________________________________________________    Interval History   NAEO    Physical Exam   Vital Signs: Temp: 98.6  F (37  C) Temp src: Oral BP: 130/84 Pulse: 100   Resp: 19 SpO2: 100 % O2 Device: Nasal  cannula (switched over to nc so that pt can have water sponges) Oxygen Delivery: 2 LPM  Weight: 110 lbs 10.73 oz    General Appearance:  Awake. Alert. No acute distress. Frail appearing.   Respiratory: Normal work of breathing on 2L. Lungs with loud upper airway breath sounds. Lower lobes sound clear.   Cardiovascular: Irregular. No obvious murmur.   GI: Nondistended. Normoactive. Soft. Non-tender.   Skin: Warm, dry.     Medical Decision Making             Data      no...